# Patient Record
(demographics unavailable — no encounter records)

---

## 2020-10-01 NOTE — PDOC.HHP
Hospitalist HPI





- History of Present Illness


Abdominal pain


History of Present Illness: 


77-year-old gentleman with a history of COPD, colon cancer status post partial 

colectomy, history of hemorrhoids, history of coronary artery disease presented 

emergency department with a complaint of abdominal pain of onset 2 days ago.  

Patient states that his last bowel movement was yesterday in the morning and it 

was a small hard stool.  He vomited once in the emergency department.  He denied

any fever or chills.  He denied any melena or hematemesis.  CT abdomen and 

pelvis done in the ED demonstrated small bowel dilatation with a transition 

point in the ileum indicating small bowel obstruction.  General surgery-Dr. Sim was contacted who recommended NG tube insertion and hospitalization for 

further management.  Patient seen wheezing.  At baseline he uses 2 L of oxygen 

by nasal cannula at home for chronic respiratory failure.








Hospitalist ROS





- Review of Systems


Other: 


Except as documented, all other systems reviewed and negative.








Hospitalist History





- Past Medical History


Cardiac: reports: CAD, HTN


Pulmonary: reports: COPD


Gastrointestinal: reports: Hemorrhoids


Heme/Onc: reports: Cancer (History of colon cancer)


Renal/: reports: Benign prostatic enlarg.





- Past Surgical History


Past Surgical History: reports: Other (History of partial colectomy.)





- Family History


Family History: reports: cardiac disorder





- Social History


Smoking Status: Former smoker


Alcohol: reports: None


Drugs: reports: none


Living Situation: With Family





- Exam


General Appearance: NAD, awake alert


Eye: PERRL, anicteric sclera


ENT: normocephalic atraumatic, no oropharyngeal lesions, moist mucosa


Neck: supple, symmetric, no JVD, no thyromegaly


Heart: RRR, no murmur, no gallops


Respiratory: rhonchi (Mild scattered rhonchi)


Respiratory - other findings: Diminished breath sounds bilaterally.


Gastrointestinal: soft, non-tender, non-distended


Gastrointestinal - other findings: Hyperactive bowel sounds


Extremities: no cyanosis, no edema


Skin: normal turgor


Neurological: cranial nerve grossly intact, no focal deficits


Musculoskeletal: normal tone, normal strength


Psychiatric: normal affect, A&O x 3





Hospitalist Results





- Labs


Result Diagrams: 


                                 10/01/20 19:03





                                 10/01/20 19:03


Lab results: 


                                        











WBC  14.4 thou/uL (4.8-10.8)  H  10/01/20  19:03    


 


Hgb  14.5 g/dL (14.0-18.0)   10/01/20  19:03    


 


Hct  44.1 % (42.0-52.0)   10/01/20  19:03    


 


MCV  93.6 fL (78.0-98.0)   10/01/20  19:03    


 


Plt Count  281 thou/uL (130-400)   10/01/20  19:03    


 


Neutrophils %  88.1 % (42.0-75.0)  H  10/01/20  19:03    


 


Sodium  140 mmol/L (136-145)   10/01/20  19:03    


 


Potassium  4.0 mmol/L (3.5-5.1)   10/01/20  19:03    


 


Chloride  100 mmol/L ()   10/01/20  19:03    


 


Carbon Dioxide  28 mmol/L (23-31)   10/01/20  19:03    


 


BUN  17 mg/dL (8.4-25.7)   10/01/20  19:03    


 


Creatinine  1.24 mg/dL (0.7-1.3)   10/01/20  19:03    


 


Glucose  165 mg/dL ()  H  10/01/20  19:03    


 


Calcium  9.9 mg/dL (7.8-10.44)   10/01/20  19:03    


 


Total Bilirubin  0.5 mg/dL (0.2-1.2)   10/01/20  19:03    


 


AST  26 U/L (5-34)   10/01/20  19:03    


 


ALT  23 U/L (8-55)   10/01/20  19:03    


 


Alkaline Phosphatase  85 U/L ()   10/01/20  19:03    


 


Serum Total Protein  8.1 g/dL (5.8-8.1)   10/01/20  19:03    


 


Albumin  4.4 g/dL (3.4-4.8)   10/01/20  19:03    


 


Lipase  35 U/L (8-78)   10/01/20  19:03    














- Radiology Interpretation


  ** CT scan - abdomen


Status: report reviewed by me (Dilated small bowel loops with transition point 

in the distal ileum, colonic diverticulosis.  Findings suggestive of small bowel

obstruction.)





Hospitalist H&P A/P





- Problem


(1) Small bowel obstruction


Code(s): K56.609 - UNSP INTESTNL OBST, UNSP AS TO PARTIAL VERSUS COMPLETE OBST  

Status: Acute   





(2) History of partial colectomy


Code(s): Z90.49 - ACQUIRED ABSENCE OF OTHER SPECIFIED PARTS OF DIGESTIVE TRACT  

Status: Acute   





(3) Chronic respiratory failure with hypoxia


Code(s): J96.11 - CHRONIC RESPIRATORY FAILURE WITH HYPOXIA   Status: Acute   





(4) COPD (chronic obstructive pulmonary disease)


Status: Acute   





(5) BPH (benign prostatic hyperplasia)


Code(s): N40.0 - BENIGN PROSTATIC HYPERPLASIA WITHOUT LOWER URINRY TRACT SYMP   

Status: Chronic   





(6) CAD (coronary artery disease)


Code(s): I25.10 - ATHSCL HEART DISEASE OF NATIVE CORONARY ARTERY W/O ANG PCTRS  

Status: Chronic   





- Plan


Plan: 


Admit to the medical floor.


NG tube to suction for decompression.


Consult to general surgery-Dr. Sim requested.


Empiric antibiotics-IV Rocephin and Flagyl given leukocytosis


IV hydration with normal saline.


Supportive measures with IV morphine for pain and IV Zofran for vomiting.


Treat COPD with DuoNeb.


Titrate oxygen.


Resume home inhalers.

## 2020-10-01 NOTE — CT
CT Abdomen Pelvis W Con



HISTORY: Abdominal pain, nausea, vomiting and diarrhea. Previous history of colon cancer



COMPARISON: 11/14/2016



FINDINGS: 

The lung bases are unremarkable. The liver, spleen, pancreas and adrenal glands are normal. No calcif
ied gallstones are seen. There are renal cysts. No free air, free fluid or lymphadenopathy seen in

the abdomen or pelvis. There are vascular calcifications without evidence of aneurysmal dilatation of
 the abdominal aorta. There are degenerative changes in the spine. There is compression of multiple

vertebrae in the spine.



The small bowel loops are dilated with transition point in the distal ileum. There is colonic diverti
culosis.



IMPRESSION: Findings are suspicious for small bowel obstruction.



Reported By: Vj Wells 

Electronically Signed:  10/1/2020 8:19 PM

## 2020-10-02 NOTE — CON
DATE OF CONSULTATION:  10/02/2020



REASON FOR CONSULT:  Small bowel obstruction.



HISTORY OF PRESENT ILLNESS:  Mr. Ambriz is a 77-year-old man who I have seen

previously for right-sided colon cancer and ventral hernia.  He has multiple medical

issues including coronary artery disease and severe COPD, which is now oxygen

dependent.  He states that he has been on oxygen pretty much continuously for the

past year at 2 L per nasal cannula.  He came to the emergency room with a 2-day

history of abdominal pain.  It started Wednesday and got really bad on Wednesday

night.  He had increasing nausea and pain and came to the emergency room last night.

 He had one episode of emesis which was nonbloody and non coffee-ground.  He states

that he had a bowel movement the day before yesterday, which was unremarkable.  He

states that he is feeling much better since having the NG tube placed in the

emergency room and that he has passed some gas today.  He has not ambulated in the

thrasher yet.  He had a CT scan performed in the emergency room with some distally

decompressed loops of small bowel and proximal dilation consistent with small bowel

obstruction. 



PAST MEDICAL HISTORY:  Coronary artery disease, status post stenting; hypertension;

severe COPD, now oxygen dependent; colon cancer, status post right hemicolectomy and

benign prostatic hypertrophy. 



PAST SURGICAL HISTORY:  Right hemicolectomy and ventral incisional hernia repair.



FAMILY HISTORY:  Heart disease.



SOCIAL HISTORY:  He is a former smoker but has not smoked in many years.  He does

not have any history of alcohol or drug abuse. 



REVIEW OF SYSTEMS:  Ten system review of systems is negative except per HPI.  His

shortness of breath is at its baseline, although he states that he was more than

usually short of breath when he came to the emergency room.  His nausea has resolved

and he is now pain free. 



PHYSICAL EXAMINATION:

VITAL SIGNS:  Patient has been afebrile since admission.  Heart rate 73,

respirations 18, 97% saturated on 2 L nasal cannula, blood pressure 121/63. 

HEENT:  Unremarkable.  Pupils are equal and extraocular movements are intact.

Facial movements are symmetric. 

NECK:  Supple without lymphadenopathy or thyroid nodules.  HEART:  Regular in its

rate and rhythm.  He does have a notable systolic murmur, which is audible over the

entire precordium, but louder on the left and radiates to the axilla.  This is

pansystolic. 

LUNGS:  Breath sounds are diminished bilaterally with both wheezes and crackles. 

ABDOMEN:  Soft, nontender, nondistended without palpable masses or hernias.  He does

have bowel sounds present. 

EXTREMITIES:  Warm, well perfused without significant edema. 

NEUROLOGIC:  No focal deficits. 

PSYCHIATRIC:  Alert, oriented and appropriate with normal affect.



LABORATORY DATA:  White count is normal at 10.7, hematocrit 35, platelets 225.  He

does have a little bit of left shift.  Electrolytes are unremarkable.  Glucose is

mildly elevated at 124.  LFTs yesterday were normal.  I have reviewed the CT images

and I agree with the written report.  His ileocolic anastomosis appears to be widely

patent. 



ASSESSMENT:  Small bowel obstruction, improved, status post NG placement and

decompression.  He has had about 450 mL of bilious NG output in the past 12 hours.

His NG tube is something __________ his symptoms have improved.  I have recommended

continued bowel rest and NG decompression today.  If he continues to improve, then a

Gastrografin small-bowel follow-through will be obtained tomorrow.  If he has

worsening symptoms, then surgery will be considered, but currently he is stable for

conservative management.  If he does require surgery, I would recommend repeating an

echocardiogram.  His last echocardiogram was a year-and-a-half ago and he had

moderate to severe aortic stenosis at that time.  I have not seen the patient in

several years.  I did not previously note such a prominent murmur.  He does not have

any history of syncope, angina or heart failure, however. 







Job ID:  [id number]

## 2020-10-02 NOTE — PDOC.FMACP
Advance Care Planning





- Problem


(1) Small bowel obstruction


Status: Acute   Code(s): K56.609 - UNSP INTESTNL OBST, UNSP AS TO PARTIAL VERSUS

COMPLETE OBST   





(2) History of partial colectomy


Status: Acute   Code(s): Z90.49 - ACQUIRED ABSENCE OF OTHER SPECIFIED PARTS OF 

DIGESTIVE TRACT   





(3) Chronic respiratory failure with hypoxia


Status: Acute   Code(s): J96.11 - CHRONIC RESPIRATORY FAILURE WITH HYPOXIA   





(4) COPD (chronic obstructive pulmonary disease)


Status: Acute   





(5) BPH (benign prostatic hyperplasia)


Status: Chronic   Code(s): N40.0 - BENIGN PROSTATIC HYPERPLASIA WITHOUT LOWER 

URINRY TRACT SYMP   





(6) CAD (coronary artery disease)


Status: Chronic   Code(s): I25.10 - ATHSCL HEART DISEASE OF NATIVE CORONARY 

ARTERY W/O ANG PCTRS   





- Note


Summary: 


Advanced Care Planning was discussed.  The diagnosis, prognosis and goals of 

care were discussed.  Appropriate forms and documentation to accomplish the 

goals of care were discussed.  All questions were answered.  The Palliative Care

Team will be engaged to assist with completion of any outstanding forms that are

needed. 


Patient is full code.





Time Spent (mins): 18

## 2020-10-02 NOTE — PDOC.HOSPP
- Subjective


Encounter Date: 10/02/20


Encounter Time: 10:00


Subjective: 


no overnight events. This morning, abdominal distention resolved after NGT 

placement. Passing gas for the first time since admission. COmplains of mildly 

sore throat since placement of NGT, otherwise no compaiunts. 





- Objective


Vital Signs & Weight: 


                             Vital Signs (12 hours)











  Temp Pulse Resp BP Pulse Ox


 


 10/02/20 07:44  98.1 F  73  18  121/63  97


 


 10/02/20 05:11  98.6 F  86  16  126/70  98


 


 10/02/20 03:07      96


 


 10/02/20 03:02   75  16   96


 


 10/02/20 01:00      98


 


 10/02/20 00:50  98.7 F  75  16  134/66  98








                                     Weight











Weight                         160 lb 0.889 oz














I&O: 


                                        











 10/01/20 10/02/20 10/03/20





 06:59 06:59 06:59


 


Intake Total  550 


 


Output Total  750 


 


Balance  -200 











Result Diagrams: 


                                 10/02/20 04:41





                                 10/02/20 04:41





Hospitalist ROS





- Review of Systems


Constitutional: denies: chills, sweats


Respiratory: denies: cough, shortness of breath


Cardiovascular: denies: chest pain, palpitations, orthopnea, paroxysmal noc. 

dyspnea


Gastrointestinal: reports: constipation.  denies: nausea, vomiting, abdominal 

pain, melena, hematochezia


Genitourinary: denies: dysuria, hematuria





- Medication


Medications: 


Active Medications











Generic Name Dose Route Start Last Admin





  Trade Name Freq  PRN Reason Stop Dose Admin


 


Albuterol/Ipratropium  3 ml  10/02/20 01:00  10/02/20 03:02





  Ipratropium/Albuterol Sulfate 3 Ml Neb  NEB   3 ml





  [id number] SHEA   Administration


 


Famotidine  20 mg  10/02/20 09:00  10/02/20 08:34





  Famotidine/Pf 20 Mg/2ml Vial  SLOW IVP   20 mg





  Q12HR SHEA   Administration


 


Heparin Sodium (Porcine)  5,000 units  10/02/20 09:00  10/02/20 08:20





  Heparin 5,000 Units/Ml Vial  SC   Not Given





  TID SHEA  


 


Sodium Chloride  1,000 mls @ 100 mls/hr  10/01/20 23:00  10/02/20 08:36





  Normal Saline 0.9%  IV   1,000 mls





  .Q10H SHEA   Administration


 


Metronidazole 500 mg/ Device  100 mls @ 100 mls/hr  10/02/20 06:00  10/02/20 

05:26





  IVPB   100 mls





  Q8HR SHEA   Administration


 


Ceftriaxone Sodium 1 gm/  100 mls @ 200 mls/hr  10/01/20 23:59  10/02/20 01:15





  Sodium Chloride  IVPB   100 mls





  Q24HR SHEA   Administration














- Exam


General Appearance: NAD, awake alert


ENT - other findings: NGT in place


Neck: no JVD


Heart: RRR, no murmur, no gallops, no rubs


Respiratory: CTAB, no wheezes, no rales, no ronchi


Gastrointestinal: soft, non-tender, non-distended, diminished bowl sounds


Gastrointestinal - other findings: periumbilical fluctuating swelling. Could not

appreciate hernia


Extremities: no edema


Psychiatric: normal affect, normal behavior, A&O x 3





Hosp A/P





- Plan





(1) Small bowel obstruction


Code(s): K56.609 - UNSP INTESTNL OBST, UNSP AS TO PARTIAL VERSUS COMPLETE OBST  

Status: Acute   





(2) History of partial colectomy


Code(s): Z90.49 - ACQUIRED ABSENCE OF OTHER SPECIFIED PARTS OF DIGESTIVE TRACT  

Status: Acute   





(3) Chronic respiratory failure with hypoxia


Code(s): J96.11 - CHRONIC RESPIRATORY FAILURE WITH HYPOXIA   Status: Acute   





(4) COPD (chronic obstructive pulmonary disease)


Status: Acute   





(5) BPH (benign prostatic hyperplasia)


Code(s): N40.0 - BENIGN PROSTATIC HYPERPLASIA WITHOUT LOWER URINRY TRACT SYMP   

Status: Chronic   





(6) CAD (coronary artery disease)


Code(s): I25.10 - ATHSCL HEART DISEASE OF NATIVE CORONARY ARTERY W/O ANG PCTRS  

Status: Chronic   





- Plan


Plan: 


no bowel compromise at this point


obstipation resolved


possible etiologies adhesions considering history of colon resection, tumor, d

iverticulitis considering findgins of diverticulosis and elevated white count


pending evaluation by surgery





continue NGT; continue ABx





full code


GI PPx: no  Ix


DVT PPx: lovenox

## 2020-10-03 NOTE — PDOC.HOSPP
- Subjective


Encounter Date: 10/03/20


Encounter Time: 09:00


Subjective: 


passed a small bm this am, is passing flatus


feels better, no abd pain or nausea





- Objective


Vital Signs & Weight: 


                             Vital Signs (12 hours)











  Temp Pulse Resp BP Pulse Ox


 


 10/03/20 09:15      97


 


 10/03/20 08:07  98.8 F  60  16  121/64  97


 


 10/03/20 03:35  98.2 F  66  20  136/67  94 L


 


 10/02/20 23:54  98.0 F  68  20  124/68  94 L








                                     Weight











Admit Weight                   160 lb 0.88 oz


 


Weight                         160 lb 0.88 oz














I&O: 


                                        











 10/02/20 10/03/20 10/04/20





 06:59 06:59 06:59


 


Intake Total 550 1200 


 


Output Total 750 950 750


 


Balance -200 250 -750











Result Diagrams: 


                                 10/03/20 05:16





                                 10/03/20 05:16





Hospitalist ROS





- Medication


Medications: 


Active Medications











Generic Name Dose Route Start Last Admin





  Trade Name Freq  PRN Reason Stop Dose Admin


 


Albuterol/Ipratropium  3 ml  10/02/20 01:00  10/03/20 06:30





  Ipratropium/Albuterol Sulfate 3 Ml Neb  NEB   Not Given





  [id number] SHEA  


 


Enoxaparin Sodium  30 mg  10/03/20 09:00  10/03/20 09:22





  Enoxaparin Sodium 30 Mg/0.3 Ml Syringe  SC   30 mg





  0900 SHEA   Administration


 


Sodium Chloride  1,000 mls @ 100 mls/hr  10/01/20 23:00  10/03/20 00:31





  Normal Saline 0.9%  IV   1,000 mls





  .Q10H SHEA   Administration


 


Metronidazole 500 mg/ Device  100 mls @ 100 mls/hr  10/02/20 06:00  10/03/20 

06:03





  IVPB   100 mls





  Q8HR SHEA   Administration


 


Ceftriaxone Sodium 1 gm/  100 mls @ 200 mls/hr  10/01/20 23:59  10/03/20 00:31





  Sodium Chloride  IVPB   100 mls





  Q24HR SHEA   Administration


 


Throat Lozenges  1 bhupendra  10/02/20 13:44  10/02/20 17:43





  Cepastat Lozenges 1 Bhupendra  PO   1 bhupendra





  Q2H PRN   Administration





  Sore Throat  














- Exam


General Appearance: awake alert


Eye: PERRL, anicteric sclera


ENT: no oropharyngeal lesions, dry oral mucosa


Neck: supple, no JVD


Heart: RRR, murmur present


Respiratory: no wheezes, no rales


Gastrointestinal: soft, non-tender, non-distended, normal bowel sounds, no guard

ing, no rigidity


Extremities: no cyanosis, no edema


Neurological: cranial nerve grossly intact, no focal deficits


Psychiatric: normal affect, A&O x 3





Hosp A/P


(1) Small bowel obstruction


Code(s): K56.609 - UNSP INTESTNL OBST, UNSP AS TO PARTIAL VERSUS COMPLETE OBST  

Status: Acute   





(2) COPD (chronic obstructive pulmonary disease)


Status: Chronic   


Qualifiers: 


   COPD type: unspecified COPD   Qualified Code(s): J44.9 - Chronic obstructive 

pulmonary disease, unspecified   





(3) BPH (benign prostatic hyperplasia)


Code(s): N40.0 - BENIGN PROSTATIC HYPERPLASIA WITHOUT LOWER URINRY TRACT SYMP   

Status: Chronic   


Qualifiers: 


   Lower urinary tract symptom presence: symptoms absent   Qualified Code(s): 

N40.0 - Benign prostatic hyperplasia without lower urinary tract symptoms   





(4) CAD (coronary artery disease)


Code(s): I25.10 - ATHSCL HEART DISEASE OF NATIVE CORONARY ARTERY W/O ANG PCTRS  

Status: Chronic   


Qualifiers: 


   Coronary Disease-Associated Artery/Lesion type: native artery   Native vs. 

transplanted heart: native heart 





(5) Hypertension


Code(s): I10 - ESSENTIAL (PRIMARY) HYPERTENSION   Status: Chronic   


Qualifiers: 


   Hypertension type: essential hypertension   Qualified Code(s): I10 - 

Essential (primary) hypertension   





- Plan





improving sbo


to ambulate as tolerated in hallway


oral diet per gen surg adv


echo for lv function and valve assesment


continue ceftriaxone, flagyl, iv fluids

## 2020-10-03 NOTE — RAD
KUB:

 

HISTORY: 

Small bowel obstruction.

 

COMPARISON: 

10/1/2020 CT examination.

 

FINDINGS: 

There is improvement to the dilated small bowel loops as compared to that prior examination.  Some of
 these loops still appear distended but more fluid-filled.  Arthritic changes of the spine and athero
sclerotic changes are noted.

 

IMPRESSION: 

Improving small bowel obstruction.

 

POS: OFF

## 2020-10-03 NOTE — PRG
DATE OF SERVICE:  10/03/2020



SUBJECTIVE:  Earlier this morning, the patient pulled out his NG tube while

sleeping.  It was a very difficult process getting it placed and he is refusing it

being replaced.  He does, however, report passing flatus and has had 2 small bowel

movements. 



OBJECTIVE:  VITAL SIGNS:  His temperature is 98.3, pulse 62, blood pressure 122/67. 

GENERAL:  He is awake, alert. 

ABDOMEN:  Soft, nondistended, nontender.



LABORATORY DATA:  His white count is 8.3, H and H of 10 and 33, platelet count 190.

KUB this morning shows significant improvement, but not yet normal. 



ASSESSMENT:  Small-bowel obstruction, improving.



PLAN:  Ice chips only.







Job ID:  [id number]

## 2020-10-04 NOTE — PDOC.HOSPP
- Subjective


Encounter Date: 10/04/20


Encounter Time: 09:30


Subjective: 


no nausea or abd pain


is passing loose bowel movements


is amb in room





- Objective


Vital Signs & Weight: 


                             Vital Signs (12 hours)











  Temp Pulse Resp BP BP Pulse Ox


 


 10/04/20 08:50       95


 


 10/04/20 07:16   71  24 H    95


 


 10/04/20 07:07  98.2 F  93  16   128/69  90 L


 


 10/04/20 05:15   117 H  22 H  143/86 H   92 L


 


 10/03/20 23:46  97.9 F  75  18   129/71  94 L








                                     Weight











Admit Weight                   160 lb 0.88 oz


 


Weight                         160 lb 0.88 oz














I&O: 


                                        











 10/03/20 10/04/20 10/05/20





 06:59 06:59 06:59


 


Intake Total 1200 1320 1500


 


Output Total 950 1250 


 


Balance 086 26 2581











Result Diagrams: 


                                 10/03/20 05:16





                                 10/03/20 05:16





Hospitalist ROS





- Medication


Medications: 


Active Medications











Generic Name Dose Route Start Last Admin





  Trade Name Freq  PRN Reason Stop Dose Admin


 


Albuterol/Ipratropium  3 ml  10/02/20 01:00  10/04/20 06:34





  Ipratropium/Albuterol Sulfate 3 Ml Neb  NEB   Not Given





  [id number] SHEA  


 


Arformoterol Tartrate  15 mcg  10/04/20 06:30  10/04/20 07:16





  Arformoterol 15 Mcg/2 Ml Neb  NEB   15 mcg





  BID-RT SHEA   Administration


 


Budesonide  0.5 mg  10/04/20 06:30  10/04/20 07:18





  Budesonide 0.5 Mg/2 Ml Neb  NEB   0.5 mg





  BID-RT SHEA   Administration


 


Enoxaparin Sodium  30 mg  10/03/20 09:00  10/04/20 08:53





  Enoxaparin Sodium 30 Mg/0.3 Ml Syringe  SC   30 mg





  0900 SHEA   Administration


 


Sodium Chloride  1,000 mls @ 100 mls/hr  10/01/20 23:00  10/04/20 08:53





  Normal Saline 0.9%  IV   1,000 mls





  .Q10H SHEA   Administration


 


Metronidazole 500 mg/ Device  100 mls @ 100 mls/hr  10/02/20 06:00  10/04/20 

05:05





  IVPB   100 mls





  Q8HR SHEA   Administration


 


Ceftriaxone Sodium 1 gm/  100 mls @ 200 mls/hr  10/01/20 23:59  10/03/20 23:40





  Sodium Chloride  IVPB   100 mls





  Q24HR SHEA   Administration


 


Throat Lozenges  1 bhupendra  10/02/20 13:44  10/02/20 17:43





  Cepastat Lozenges 1 Bhupendra  PO   1 bhupendra





  Q2H PRN   Administration





  Sore Throat  














- Exam


General Appearance: awake alert


Eye: PERRL, anicteric sclera


ENT: no oropharyngeal lesions, moist mucosa


Neck: supple, no JVD


Heart: RRR, no murmur


Respiratory: no wheezes, no rales, rhonchi


Gastrointestinal: soft, non-tender, normal bowel sounds, no rigidity


Extremities: no cyanosis, no edema


Neurological: cranial nerve grossly intact, no focal deficits


Psychiatric: normal affect, A&O x 3





Hosp A/P


(1) Small bowel obstruction


Code(s): K56.609 - UNSP INTESTNL OBST, UNSP AS TO PARTIAL VERSUS COMPLETE OBST  

Status: Acute   





(2) COPD (chronic obstructive pulmonary disease)


Status: Chronic   


Qualifiers: 


   COPD type: unspecified COPD   Qualified Code(s): J44.9 - Chronic obstructive 

pulmonary disease, unspecified   





(3) BPH (benign prostatic hyperplasia)


Code(s): N40.0 - BENIGN PROSTATIC HYPERPLASIA WITHOUT LOWER URINRY TRACT SYMP   

Status: Chronic   


Qualifiers: 


   Lower urinary tract symptom presence: symptoms absent   Qualified Code(s): 

N40.0 - Benign prostatic hyperplasia without lower urinary tract symptoms   





(4) CAD (coronary artery disease)


Code(s): I25.10 - ATHSCL HEART DISEASE OF NATIVE CORONARY ARTERY W/O ANG PCTRS  

Status: Chronic   


Qualifiers: 


   Coronary Disease-Associated Artery/Lesion type: native artery   Native vs. 

transplanted heart: native heart 





(5) Hypertension


Code(s): I10 - ESSENTIAL (PRIMARY) HYPERTENSION   Status: Chronic   


Qualifiers: 


   Hypertension type: essential hypertension   Qualified Code(s): I10 - 

Essential (primary) hypertension   





- Plan





improving sbo


to ambulate as tolerated in hallway


oral diet per gen surg adv, on liq diet now


echo shows normal ef, there is aortic valve sclerosis on obvious stenosis


continue ceftriaxone, flagyl, iv fluids


performist and budesonide inhalers, may use home meds, he does not like 

substitutions

## 2020-10-04 NOTE — RAD
PORTABLE CHEST: 

 

History: Difficulty breathing, shortness of breath. 

 

Comparison: 2018

 

FINDINGS: 

There is cardiomegaly and mild vascular congestion. There are hazy interstitial and alveolar infiltra
laila throughout both lungs, more pronounced in the right lower lung. Findings may represent mild inter
stitial and alveolar edema, however, superimposed inflammatory infiltrates especially in the right odell
ng base is suspected. 

 

IMPRESSION: 

As above.

 

POS: AGW

## 2020-10-04 NOTE — CON
DATE OF CONSULTATION:  10/04/2020



CONSULTING PHYSICIAN:  Dr. Hogue.



REASON FOR CONSULTATION:  Look at the patient's COPD medications.



HISTORY OF PRESENT ILLNESS:  Mr. Ambriz is a 77-year-old male with severe COPD with a

baseline FEV1 about 0.5 L.  He is a former patient of Dr. Manohar Osborne.  I believe

the patient is about to establish a relationship with Dr. Verde in the office.  He

is in the hospital right now for treatment of a small-bowel obstruction.  He says he

is scheduled to go home tomorrow.  [facility] was having trouble getting his

home medications, which are Perforomist and budesonide.  The patient has brought his

own from home and is using it and is doing well with that and has no complaints

about his breathing at this time. 



PAST MEDICAL HISTORY:  

1. COPD.

2. Coronary artery disease.

3. Hypertension.

4. Colon cancer.

5. Prostatic hypertrophy.



PAST SURGICAL HISTORY:  

1. Right hemicolectomy.

2. Ventral hernia repair.



SOCIAL HISTORY:  Has not smoked in many years.  Not consumed alcohol.



FAMILY HISTORY:  Remarkable for heart disease.



REVIEW OF SYSTEMS:  12-point review of systems is otherwise negative.



CURRENT MEDICATIONS:  These were reviewed.  Pertinent pulmonary medications include:

1. Perforomist.

2. Budesonide.



PHYSICAL EXAMINATION:

VITAL SIGNS:  Temperature 98.2, pulse 81, respirations 24, O2 saturations 95% on 2

L. 

HEENT:  Unremarkable. 

NECK:  No JVD. 

CHEST:  Clear to auscultation. 

CARDIAC:  S1 and S2.  Regular with 2/6 systolic murmur. 

ABDOMEN:  Soft and nontender.  No masses noted.  Bowel sounds normoactive. 

EXTREMITIES:  No clubbing, cyanosis, or edema.



LABORATORY DATA:  No discrete abnormalities noted.



ASSESSMENT:  

1. Small-bowel obstruction.

2. Stable chronic obstructive pulmonary disease.



PLAN:  Continue the Brovana and Perforomist as you are doing.  The patient

apparently cannot take albuterol. 







Job ID:  [id number]

## 2020-10-04 NOTE — PRG
DATE OF SERVICE:  10/04/2020



SUBJECTIVE:  The patient is doing well from his gut, but he is having an

exacerbation of his COPD.  The usual medications used are not hospital formulary,

his wife bring them in.  He was able to get a neb this morning and is a little bit

better.  He does report that his bowels are working.  He has had three bowel

movements.  He is passing gas.  He denies any abdominal pain. 



OBJECTIVE:  VITAL SIGNS:  Temperature is 98.2, pulse 71, blood pressure 120/69, O2

saturation is 95% on 2 L.  On exam, he looks okay, his respiratory rate is a little

high at 22. 

ABDOMEN:  Soft, nondistended, nontender.



ASSESSMENT:  

1. Chronic obstructive pulmonary disease exacerbation.

2. Small-bowel obstruction, resolving.



PLAN:  Pulmonary consult for adjustment of medications and we will start clear

liquids. 







Job ID:  [id number]

## 2020-10-05 NOTE — PDOC.HOSPP
- Subjective


Encounter Date: 10/05/20


Encounter Time: 07:45


Subjective: 


breathing better, got off bipap, says it helped him a lot


no abd pain or nausea


is tolerating oral diet





- Objective


Vital Signs & Weight: 


                             Vital Signs (12 hours)











  Temp Pulse Resp Pulse Ox


 


 10/05/20 11:00  99.0 F   


 


 10/05/20 07:52     99


 


 10/05/20 07:50  99.4 F   


 


 10/05/20 05:18   81  18  99


 


 10/05/20 04:00     98


 


 10/05/20 03:49  98.8 F   


 


 10/05/20 01:02   82  14  100








                                     Weight











Admit Weight                   160 lb 0.88 oz


 


Weight                         160 lb 0.88 oz











                            Most Recent Monitor Data











Heart Rate from ECG            86


 


NIBP                           100/65


 


NIBP BP-Mean                   76


 


Respiration from ECG           21


 


SpO2                           90














I&O: 


                                        











 10/04/20 10/05/20 10/06/20





 06:59 06:59 06:59


 


Intake Total 1320 2900 


 


Output Total 1250 725 


 


Balance 70 2175 











Result Diagrams: 


                                 10/05/20 10:32





                                 10/05/20 10:32





Hospitalist ROS





- Medication


Medications: 


Active Medications











Generic Name Dose Route Start Last Admin





  Trade Name Freq  PRN Reason Stop Dose Admin


 


Albuterol/Ipratropium  3 ml  10/02/20 01:00  10/05/20 05:21





  Ipratropium/Albuterol Sulfate 3 Ml Neb  NEB   Not Given





  [id number]-RL SHEA  


 


Arformoterol Tartrate  15 mcg  10/04/20 06:30  10/05/20 05:19





  Arformoterol 15 Mcg/2 Ml Neb  NEB   15 mcg





  BID-RT SHEA   Administration


 


Budesonide  0.5 mg  10/04/20 06:30  10/05/20 05:18





  Budesonide 0.5 Mg/2 Ml Neb  NEB   0.5 mg





  BID-RT SHEA   Administration


 


Enoxaparin Sodium  30 mg  10/03/20 09:00  10/05/20 08:56





  Enoxaparin Sodium 30 Mg/0.3 Ml Syringe  SC   30 mg





  0900 SHEA   Administration


 


Metronidazole 500 mg/ Device  100 mls @ 100 mls/hr  10/02/20 06:00  10/05/20 

05:11





  IVPB   100 mls





  Q8HR SHEA   Administration


 


Ceftriaxone Sodium 1 gm/  100 mls @ 200 mls/hr  10/01/20 23:59  10/04/20 23:31





  Sodium Chloride  IVPB   100 mls





  Q24HR SHEA   Administration


 


Ondansetron HCl  4 mg  10/01/20 22:57  10/04/20 21:01





  Ondansetron Pf 4 Mg/2 Ml Vial  IVP   4 mg





  Q6H PRN   Administration





  Nausea/Vomiting  


 


Budesonide 0.25 Mg/2  0 each  10/04/20 18:30  10/05/20 08:00





  Ml Neb  NEB   Not Given





  BID-RT SHEA  


 


Throat Lozenges  1 bhupendra  10/02/20 13:44  10/02/20 17:43





  Cepastat Lozenges 1 Bhupendra  PO   1 bhupendra





  Q2H PRN   Administration





  Sore Throat  














- Exam


General Appearance: awake alert


Eye: PERRL, anicteric sclera


ENT: no oropharyngeal lesions, moist mucosa


Neck: supple, no JVD


Heart: RRR, no murmur


Respiratory: no wheezes, rales, rhonchi


Gastrointestinal: soft, non-tender, non-distended, normal bowel sounds


Extremities: no cyanosis, no edema


Neurological: cranial nerve grossly intact, no focal deficits


Psychiatric: normal affect, A&O x 3





Hosp A/P


(1) Small bowel obstruction


Code(s): K56.609 - UNSP INTESTNL OBST, UNSP AS TO PARTIAL VERSUS COMPLETE OBST  

Status: Acute   





(2) COPD (chronic obstructive pulmonary disease)


Status: Chronic   


Qualifiers: 


   COPD type: COPD with acute exacerbation   Qualified Code(s): J44.1 - Chronic 

obstructive pulmonary disease with (acute) exacerbation   





(3) BPH (benign prostatic hyperplasia)


Code(s): N40.0 - BENIGN PROSTATIC HYPERPLASIA WITHOUT LOWER URINRY TRACT SYMP   

Status: Chronic   


Qualifiers: 


   Lower urinary tract symptom presence: symptoms absent   Qualified Code(s): 

N40.0 - Benign prostatic hyperplasia without lower urinary tract symptoms   





(4) CAD (coronary artery disease)


Code(s): I25.10 - ATHSCL HEART DISEASE OF NATIVE CORONARY ARTERY W/O ANG PCTRS  

Status: Chronic   


Qualifiers: 


   Coronary Disease-Associated Artery/Lesion type: native artery   Native vs. 

transplanted heart: native heart 





(5) Hypertension


Code(s): I10 - ESSENTIAL (PRIMARY) HYPERTENSION   Status: Chronic   


Qualifiers: 


   Hypertension type: essential hypertension   Qualified Code(s): I10 - 

Essential (primary) hypertension   





- Plan





got volume overloaded, better after lasix yesterday evening, one more dose now, 

has normal ef


improving sbo


to ambulate as tolerated in hallway


diet per gen surg adv, on liq diet now


echo shows normal ef, there is aortic valve sclerosis on obvious stenosis


continue ceftriaxone, flagyl, iv fluids


performist and budesonide inhalers, may use home meds, he does not like 

substitutions


albuterol caused break out over his lips, tongue and inner cheeks which makes it

hard for wearing dentures per patient.

## 2020-10-05 NOTE — PDOC.GSPN
Surgery Progress Note: Subj





- Subjective


Narrative: 





Patient feels really good.  He says he is breathing much better since using the 

positive airway pressure machine.  He is back on nasal cannula now.  His abdomen

is soft nondistended nontender and he is tolerating clear liquid diet without 

nausea or pain.  He is passing gas and having bowel movements.  I am advancing 

him to a full liquid diet.  If he tolerates this he can go home from my 

standpoint.  I would recommend he stay on full liquid diet for couple more days 

and then advance to a soft diet for a few more days and then resume her regular 

diet.  He can follow-up in my clinic on an as-needed basis.





Surgery Progress Note: Obj





- Vital signs


Vital signs: 


                            Vital Signs - Most Recent











Temp Pulse Resp BP Pulse Ox


 


 99.0 F   81   18   144/76 H  99 


 


 10/05/20 11:00  10/05/20 05:18  10/05/20 05:18  10/04/20 19:54  10/05/20 07:52














Surgery Progress Note: Results





- Labs


Result Diagrams: 


                                 10/05/20 10:32





                                 10/05/20 10:32


Lab results: 


                         Laboratory Results - last 12 hr











  10/05/20 10/05/20 10/05/20





  10:32 10:32 10:32


 


WBC    11.0 H


 


RBC    3.92 L


 


Hgb    11.9 L


 


Hct    36.8 L


 


MCV    93.9


 


MCH    30.3


 


MCHC    32.2


 


RDW    12.4


 


Plt Count    211


 


MPV    7.8


 


Neutrophils %    75.4 H


 


Lymphocytes %    11.9 L


 


Monocytes %    11.9 H


 


Eosinophils %    0.5


 


Basophils %    0.2


 


Neutrophils #    8.3 H


 


Lymphocytes #    1.3


 


Monocytes #    1.3 H


 


Eosinophils #    0.1


 


Basophils #    0.0


 


Sodium  143  


 


Potassium  3.1 L  


 


Chloride  107  


 


Carbon Dioxide  27  


 


Anion Gap  12  


 


BUN  20  


 


Creatinine  1.23  


 


Estimated GFR (MDRD)  57  


 


Glucose  127 H  


 


Calcium  7.9  


 


B-Natriuretic Peptide   1270.4 H

## 2020-10-05 NOTE — RAD
CHEST 1 VIEW PORTABLE:

 

Date:  10/05/2020

 

HISTORY:  

Follow-up pneumonia. 

 

COMPARISON:  

10/04/2020. 

 

FINDINGS:

There is some persistent but improving patchy bilateral interstitial and alveolar opacity changes, pa
rticularly in the upper and mid lung zones, with persistent more confluent parenchymal changes in the
 right lower lobe with some right pleural effusion, and increased markings in the left base. No signi
ficant new process. 

 

IMPRESSION: 

Bilateral improving interstitial and alveolar opacity changes with persistent parenchymal changes, pa
rticularly in the bases, right greater than left. Continue short-term follow-up. 

 

 

POS: RRE

## 2020-10-05 NOTE — PRG
DATE OF SERVICE:  



SUBJECTIVE:  He was apparently transferred back to [facility], [place] last night with increasing

shortness of breath.  He says he is better today and has no acute complaints.  He

briefly wear BiPAP, but is off that this morning.  He is very anxious to be

discharged.  Of note, his chest x-ray shows rather prominent bilateral infiltrates.

Unfortunately, I do not have anything from his admission for comparison.  There is

an abdominal CT scan from 10/02 that does not show much of __________ anything in

his lower lobes of his lungs.  An echocardiogram from 10/03, demonstrated some

diastolic dysfunction. 



PHYSICAL EXAMINATION:

GENERAL:  This morning, he looks calm.  No complaints. 

HEENT:  Unremarkable. 

NECK:  No JVD. 

LUNGS:  Some inspiratory crackles at bases. 

CARDIAC:  S1 and S2, regular. 

ABDOMEN:  Soft. 

EXTREMITIES:  No edema.



LABORATORY DATA:  No new labs were obtained today.



ASSESSMENT:  Acute hypoxic and hypercapnic respiratory failure requiring noninvasive

ventilation-seems to be better after diuretics. 



RECOMMENDATIONS:  

1. Stop IV fluids.

2. Repeat chest x-ray.

3. The patient is already on antibiotics.

4. I think he is going to have to be monitored in the hospital for today unless he

shows dramatic improvement by this afternoon. 







Job ID:  [id number]

## 2020-10-06 NOTE — PDOC.HOSPP
- Subjective


Encounter Date: 10/06/20


Encounter Time: 12:00


Subjective: 


was on bipap for 2 hrs overnight, says he feels good when he is on it


currently on nasal canula


is tolerating oral diet





- Objective


Vital Signs & Weight: 


                             Vital Signs (12 hours)











  Temp Pulse Resp Pulse Ox


 


 10/06/20 11:00  98.4 F   


 


 10/06/20 08:11   84  18  95


 


 10/06/20 08:08   84  18  95


 


 10/06/20 08:00     96


 


 10/06/20 07:00  99.2 F   


 


 10/06/20 03:00  98.9 F   








                                     Weight











Admit Weight                   160 lb 0.88 oz


 


Weight                         160 lb 0.88 oz











                            Most Recent Monitor Data











Heart Rate from ECG            93


 


NIBP                           115/80


 


NIBP BP-Mean                   91


 


Respiration from ECG           19


 


SpO2                           88














I&O: 


                                        











 10/05/20 10/06/20 10/07/20





 06:59 06:59 06:59


 


Intake Total 2900 2177 


 


Output Total 725 400 


 


Balance 2175 1777 











Result Diagrams: 


                                 10/06/20 03:21





                                 10/06/20 03:21





Hospitalist ROS





- Medication


Medications: 


Active Medications











Generic Name Dose Route Start Last Admin





  Trade Name Freq  PRN Reason Stop Dose Admin


 


Albuterol/Ipratropium  3 ml  10/02/20 01:00  10/06/20 08:07





  Ipratropium/Albuterol Sulfate 3 Ml Neb  NEB   Not Given





  [id number] SHEA  


 


Arformoterol Tartrate  15 mcg  10/04/20 06:30  10/06/20 08:08





  Arformoterol 15 Mcg/2 Ml Neb  NEB   15 mcg





  BID-RT SHEA   Administration


 


Budesonide  0.5 mg  10/04/20 06:30  10/06/20 08:11





  Budesonide 0.5 Mg/2 Ml Neb  NEB   0.5 mg





  BID-RT SHEA   Administration


 


Enoxaparin Sodium  30 mg  10/03/20 09:00  10/06/20 08:30





  Enoxaparin Sodium 30 Mg/0.3 Ml Syringe  SC   30 mg





  0900 SHEA   Administration


 


Metronidazole 500 mg/ Device  100 mls @ 100 mls/hr  10/02/20 06:00  10/06/20 

13:40





  IVPB   100 mls





  Q8HR SHEA   Administration


 


Ceftriaxone Sodium 1 gm/  100 mls @ 200 mls/hr  10/01/20 23:59  10/06/20 00:57





  Sodium Chloride  IVPB   100 mls





  Q24HR SHEA   Administration


 


Ondansetron HCl  4 mg  10/01/20 22:57  10/04/20 21:01





  Ondansetron Pf 4 Mg/2 Ml Vial  IVP   4 mg





  Q6H PRN   Administration





  Nausea/Vomiting  


 


Budesonide 0.25 Mg/2  0 each  10/04/20 18:30  10/06/20 08:06





  Ml Neb  NEB   Not Given





  BID-RT SHEA  


 


Throat Lozenges  1 bhupendra  10/02/20 13:44  10/02/20 17:43





  Cepastat Lozenges 1 Bhupendra  PO   1 bhupendra





  Q2H PRN   Administration





  Sore Throat  














- Exam


General Appearance: awake alert


Eye: PERRL, anicteric sclera


ENT: no oropharyngeal lesions, moist mucosa


Neck: supple, no JVD


Heart: RRR, no murmur


Respiratory: no wheezes, rales, rhonchi


Gastrointestinal: soft, non-tender, non-distended, normal bowel sounds, no 

guarding, no rigidity


Extremities: no cyanosis, no edema


Neurological: cranial nerve grossly intact, no focal deficits


Psychiatric: normal affect, A&O x 3





Hosp A/P


(1) Small bowel obstruction


Code(s): K56.609 - UNSP INTESTNL OBST, UNSP AS TO PARTIAL VERSUS COMPLETE OBST  

Status: Resolved   





(2) COPD (chronic obstructive pulmonary disease)


Status: Chronic   


Qualifiers: 


   COPD type: COPD with acute exacerbation   Qualified Code(s): J44.1 - Chronic 

obstructive pulmonary disease with (acute) exacerbation   





(3) BPH (benign prostatic hyperplasia)


Code(s): N40.0 - BENIGN PROSTATIC HYPERPLASIA WITHOUT LOWER URINRY TRACT SYMP   

Status: Chronic   


Qualifiers: 


   Lower urinary tract symptom presence: symptoms absent   Qualified Code(s): 

N40.0 - Benign prostatic hyperplasia without lower urinary tract symptoms   





(4) CAD (coronary artery disease)


Code(s): I25.10 - ATHSCL HEART DISEASE OF NATIVE CORONARY ARTERY W/O ANG PCTRS  

Status: Chronic   


Qualifiers: 


   Coronary Disease-Associated Artery/Lesion type: native artery   Native vs. 

transplanted heart: native heart 





(5) Hypertension


Code(s): I10 - ESSENTIAL (PRIMARY) HYPERTENSION   Status: Chronic   


Qualifiers: 


   Hypertension type: essential hypertension   Qualified Code(s): I10 - 

Essential (primary) hypertension   





- Plan





got volume overloaded, better after lasix and diuresis, has normal EF.


sbo resolved, had multiple loose BM's


to ambulate as tolerated in hallway


diet per gen surg adv, on liq diet now


echo shows normal ef, there is aortic valve sclerosis on obvious stenosis


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


albuterol causes break out over his lips, tongue and inner cheeks which makes it

hard for wearing dentures


likely dc plan in am if cleared by 


may tx to med floor to continued monitoring (was on 2 hrs of bipap last night)

## 2020-10-06 NOTE — PRG
DATE OF SERVICE:  10/06/2020



SUBJECTIVE:  The patient is feeling better and wants to go home.  However, he did

use the BiPAP last night. 



OBJECTIVE:  VITAL SIGNS:  His temperature is 98.4, pulse 93, blood pressure 115/80,

O2 saturation 88%. 

HEENT:  Unremarkable. 

NECK:  No adenopathy or JVD. 

LUNGS:  Diminished breath sounds throughout. 

CARDIAC:  S1 and S2, regular. 

ABDOMEN:  Protuberant. 

EXTREMITIES:  No edema.



LABORATORY DATA:  White cell 8.5, hematocrit 35.9, and platelet count 194.  Sodium

140, BUN 16, creatinine 1.0, and glucose 110. 



ASSESSMENT:  

1. Chronic respiratory failure secondary to chronic obstructive pulmonary disease.

2. Ileus.



PLAN:  Because of the patient's chronic respiratory failure secondary to COPD, I am

prescribing a Trilogy ventilator for treatment of his chronic respiratory failure.

The respiratory failure is solely due to COPD and traditional CPAP or BiPAP would

not work in treating this.  Therefore, I am prescribing Trilogy ventilator at

settings that are appropriate for treatment of this.  Hopefully, this can be set up

today and he can go home afterward. 







Job ID:  [id number]

## 2020-10-07 NOTE — DIS
DATE OF ADMISSION:  10/01/2020



DATE OF DISCHARGE:  10/06/2020



DISCHARGE DISPOSITION:  Home.



PRIMARY DISCHARGE DIAGNOSES:  Acute on chronic respiratory failure with hypoxia

secondary to chronic obstructive pulmonary disease exacerbation; small bowel

obstruction, resolved; coronary artery disease; benign prostatic hyperplasia;

hypertension. 



PROCEDURES DONE DURING HOSPITALIZATION:  Abdominal pelvic CAT scan with contrast

done on admission was suspicious for small bowel obstruction.  Echo with 2D Doppler

showed ejection fraction of 50% to 55%, aortic valve sclerosis was seen but no

stenosis.  H and H are 11 and 35, platelet count 194, white count of 8, MCV is 94.

Discharge BUN and creatinine are 16 and 1.0.  BNP was 1270.  Albumin is 4.4. 



DISCHARGE MEDICATIONS:  

1. Crestor 20 mg p.o. daily.

2. Aspirin 81 mg p.o. daily.

3. Enalapril 10 mg p.o. daily.

4. Flomax 0.4 mg p.o. daily.

5. Perforomist nebulizer twice daily.

6. Proscar 5 mg p.o. at bedtime.

7. Pulmicort nebulizer twice daily.  

8. Potassium chloride 20 mEq p.o. on alternate days for a total of 3 tablets.

9. Lasix 40 mg daily for a total of 6 days.

10. Omnicef 300 mg p.o. twice daily for 5 days.



ALLERGIES:  ALBUTEROL.



INPATIENT CONSULT:  Dr. Ledesma for Pulmonology, Dr. Hogue for General Surgery.



DISCHARGE PLAN:  The patient to follow up with Dr. Verde, his pulmonologist, in 1

week.  He needs to follow up with Dr. Samson Brown, his primary care physician in 1

week. 



BRIEF COURSE DURING HOSPITALIZATION:  The patient initially came in on the 1st of October with complaints of abdominal pain and nausea.  Initial CAT scan was

suspicious for small bowel obstruction.  He has had NG tube today in with

low-intermittent suction done.  The patient responded well to conservative measures

and medical management of small bowel obstruction.  During the course of his stay,

the patient developed wheezing and was in COPD exacerbation.  There was also

suspicion for possible right lung pneumonia.  He was placed on broad-spectrum

antibiotics.  He is allergic to albuterol, which causes breakout of his lips and

oral cavity, which makes it hard for him to chew with dentures.  He has known

history of end-stage COPD.  He was evaluated by Dr. Ledesma as well for Pulmonology.

 The patient was briefly in ICU on BiPAP.  In view of his chronic lung disease, Dr. Ledesma has arranged Trilogy ventilator at home.  He is tolerating oral solid diet

prior to discharge.  He has been cleared by all specialists for discharge.  Please

see a face-to-face documentation for the day of discharge on [facility] [employer]. 







Job ID:  [id number]

## 2020-11-02 NOTE — RAD
PA AND LATERAL CHEST:

 

Date:  11/02/2020

 

COMPARISON:  

10/05/2020 exam. 

 

HISTORY:  

Pneumonia. 

 

FINDINGS:

The bibasilar infiltrates noted on the previous exam have resolved. There are chronic lung changes se
en. No acute process identified. 

 

IMPRESSION: 

Chronic appearing lung change. No acute process. 

 

 

 

POS: [facility]